# Patient Record
Sex: MALE | Race: WHITE
[De-identification: names, ages, dates, MRNs, and addresses within clinical notes are randomized per-mention and may not be internally consistent; named-entity substitution may affect disease eponyms.]

---

## 2018-05-13 ENCOUNTER — HOSPITAL ENCOUNTER (EMERGENCY)
Dept: HOSPITAL 74 - FER | Age: 28
Discharge: HOME | End: 2018-05-13
Payer: COMMERCIAL

## 2018-05-13 VITALS — BODY MASS INDEX: 31.5 KG/M2

## 2018-05-13 VITALS — SYSTOLIC BLOOD PRESSURE: 157 MMHG | TEMPERATURE: 98.7 F | HEART RATE: 77 BPM | DIASTOLIC BLOOD PRESSURE: 105 MMHG

## 2018-05-13 DIAGNOSIS — F41.0: Primary | ICD-10-CM

## 2018-05-13 NOTE — PDOC
History of Present Illness





- General


History Source: Patient


Exam Limitations: No Limitations





- History of Present Illness


Initial Comments: 


05/13/18 21:18


The patient is a 27-year-old male, with no significant past medical history, 

who presents to the ED with s/p panic attack and headache. Patient states prior 

to presentation he was experiencing labored breathing and an irregular 

heartbeat which have now resolved. He is now complaining of a frontal headache 

that he rates a 7/10 in severity. As per patient's mother, he has been 

experiencing similar symptoms for the past three years around the same time (

during May, June, July) for which he has been worked up at many different 

facilities. His symptoms have been attributed to anxiety and panic attacks. He 

denies taking any medications. He visited an Urgent Care clinic on Tuesday 5/8/ 18 and was worked up; all tests were negative. The patient has seen a 

Cardiologist in the past and had an EKG and echo done which were also negative. 

The patient is currently not seeing a psychiatrist. 





The patient denies any fever, chills, nausea, vomiting, diarrhea, or abdominal 

pain.


Denies any shortness of breath or chest pain. 





Allergies: NKA


Social History: The patient denies any tobacco, alcohol, or drug use. 





<Karyna Wolf - Last Filed: 05/13/18 21:34>





<Jose Pina - Last Filed: 05/14/18 06:31>





- General


Chief Complaint: Pain, Acute


Stated Complaint: PANIC ATTACK/HEADACHE


Time Seen by Provider: 05/13/18 20:39





Past History





<Karyna Wolf - Last Filed: 05/13/18 21:34>





- Past Medical History


COPD: No


Other medical history: PANIC ATTACK IN THE PAST





- Suicide/Smoking/Psychosocial Hx


Smoking History: Never smoked


Have you smoked in the past 12 months: No


Information on smoking cessation initiated: No


Hx Alcohol Use: No


Drug/Substance Use Hx: No


Substance Use Type: None





<Jose Pina - Last Filed: 05/14/18 06:31>





- Past Medical History


Allergies/Adverse Reactions: 


 Allergies











Allergy/AdvReac Type Severity Reaction Status Date / Time


 


No Known Allergies Allergy   Verified 05/13/18 20:32











Home Medications: 


Ambulatory Orders





Diazepam [Valium] 5 mg PO DAILY PRN #3 tablet MDD 5mg 05/13/18 











**Review of Systems





- Review of Systems


Able to Perform ROS?: Yes


Comments:: 


05/13/18 21:20


GENERAL/CONSTITUTIONAL: No fever or chills. No weakness.


HEAD, EYES, EARS, NOSE AND THROAT: No change in vision. No ear pain or 

discharge. No sore throat.


CARDIOVASCULAR: No chest pain or shortness of breath.


RESPIRATORY: No cough, wheezing, or hemoptysis.


GASTROINTESTINAL: No nausea, vomiting, diarrhea or constipation.


GENITOURINARY: No dysuria, frequency, or change in urination.


MUSCULOSKELETAL: No joint or muscle swelling or pain. No neck or back pain.


SKIN: No rash


NEUROLOGIC: (+)Headache. No vertigo, loss of consciousness, or change in 

strength/sensation.


ENDOCRINE: No increased thirst. No abnormal weight change.


HEMATOLOGIC/LYMPHATIC: No anemia, easy bleeding, or history of blood clots.


ALLERGIC/IMMUNOLOGIC: No hives or skin allergy.








<Karyna Wolf - Last Filed: 05/13/18 21:34>





*Physical Exam





- Vital Signs


 Last Vital Signs











Temp Pulse Resp BP Pulse Ox


 


 98.7 F   77   16   157/105   100 


 


 05/13/18 20:32  05/13/18 20:32  05/13/18 20:32  05/13/18 20:32  05/13/18 20:32














- Physical Exam


Comments: 


05/13/18 21:20


GENERAL: (+)Anxious-appearing, slightly diaphoretic. Awake, alert, and fully 

oriented, in no acute distress.


HEAD: No signs of trauma


EYES: PERRLA, EOMI, sclera anicteric, conjunctiva clear


ENT: Auricles normal inspection, nares patent, oropharynx clear without 


exudates. Moist mucosa.


NECK: Normal ROM, supple, no lymphadenopathy, JVD, or masses


LUNGS: Breath sounds equal, clear to auscultation bilaterally.  No wheezes, and 

no crackles


HEART: Regular rate and rhythm, normal S1 and S2, no murmurs, rubs or gallops


ABDOMEN: Soft, nontender, normoactive bowel sounds.  No guarding, no rebound.  

No masses


EXTREMITIES: Normal range of motion, no edema.  No clubbing or cyanosis. No 

cords, erythema, or tenderness


NEUROLOGICAL: Alert and oriented x 3. Moves all extremities. Face is symmetric. 


SKIN: Warm, Dry, normal turgor, no rashes or lesions noted.





<Karyna Wolf - Last Filed: 05/13/18 21:34>





- Vital Signs


 Last Vital Signs











Temp Pulse Resp BP Pulse Ox


 


 98.7 F   77   16   157/105   100 


 


 05/13/18 20:32  05/13/18 20:32  05/13/18 20:32  05/13/18 20:32  05/13/18 20:32














<Jose Pina - Last Filed: 05/14/18 06:31>





Medical Decision Making





- Medical Decision Making





05/14/18 06:30


panic attack


symptoms now mostly resolved


discussed with pt appropriate use of meds/ complementary therapies for acute 

attack


psych fu





<Jose Pina - Last Filed: 05/14/18 06:31>





*DC/Admit/Observation/Transfer





- Attestations


Scribe Attestion: 


05/13/18 21:21





Documentation prepared by Karyna Wolf, acting as medical scribe for Jose Pina MD.





<Karyna Wolf - Last Filed: 05/13/18 21:34>





<Jose Pina - Last Filed: 05/14/18 06:31>


Diagnosis at time of Disposition: 


 Panic attack








- Discharge Dispostion


Disposition: HOME


Condition at time of disposition: Stable





- Prescriptions


Prescriptions: 


Diazepam [Valium] 5 mg PO DAILY PRN #3 tablet MDD 5mg


 PRN Reason: Anxiety





- Referrals


Referrals: 


David Silva MD [Staff Physician] - 


Jcarlos Morris MD [Staff Physician] - 





- Patient Instructions


Printed Discharge Instructions:  DI for Panic Disorder